# Patient Record
Sex: FEMALE | Race: WHITE | NOT HISPANIC OR LATINO | Employment: OTHER | URBAN - METROPOLITAN AREA
[De-identification: names, ages, dates, MRNs, and addresses within clinical notes are randomized per-mention and may not be internally consistent; named-entity substitution may affect disease eponyms.]

---

## 2023-07-12 ENCOUNTER — PATIENT OUTREACH (OUTPATIENT)
Dept: ONCOLOGY | Facility: CLINIC | Age: 63
End: 2023-07-12
Payer: COMMERCIAL

## 2023-07-12 ENCOUNTER — TRANSCRIBE ORDERS (OUTPATIENT)
Dept: OTHER | Age: 63
End: 2023-07-12

## 2023-07-12 DIAGNOSIS — D69.6 THROMBOCYTOPENIA (H): Primary | ICD-10-CM

## 2023-07-12 NOTE — PROGRESS NOTES
Referral received for benign heme services, see below.    Referral reason: Referred by [facility], per visit note:    Given you have a longstanding history of low platelet count plus family history of low platelet count and bleeding diathesis, you could benefit from platelet aggregation study and platelet electron microscopy to rule out inherited/congenital platelet disorders      Current abnormal labs: Available in CareEverywhere    Outreach: Call not placed to patient regarding referral.    Plan: Triage instructions updated and sent to NPS for completion.

## 2024-02-07 NOTE — PROGRESS NOTES
[facility]  [facility]  [facility] [address], [address], [place] [address]  Main: [phone], Fax: [phone]      Outpatient Visit Note:    Patient: Maxim Potter  MRN: [id number]  : 1960  SIRIA: 2024    History of Present Illness:  Maxim Potter is a 64 year old female with a history of CKD3, autoimmune hypothyroidism, alpha-1-antitrypsin carrier status, and arthritis that was referred to the [facility] for evaluation of thrombocytopenia and bleeding diatheses.    Pertinent Clinical History:  Maxim has a history of chronic thrombocytopenia, dating as far back as ~. She does not know if her platelets were low prior to that (was never told that they were).     There are documented platelets counts from 2017 in the 70-100K range.     For the past 2 years, platelets have been stable in the 50K range. The lowest platelet count she ever recalls seeing was 38K.     She has had otherwise normal CBCs. She has undergone extensive work-up for thrombocytopenia in the past. She has had negative hepatitis C and HIV screening, normal nutrient levels (B12, folate, Copper), normal hepatic function, normal SPEP, and unremarkable bone marrow biopsies  As such, she has historically been given the diagnosis of chronic ITP.  The aforementioned results are all available for review in Care Everywhere. She has never had ITP treatment before as her platelets have never dropped low enough to require this.    Interestingly, she reports a family history of thrombocytopenia plus bleeding diathesis (menorrhagia and epistaxis), particularly in her mother and maternal grandmother.    Maxim's bleeding history is significant for menorrhagia. She had partial hysterectomy in her 50s. When she was menstruating, she reports that though her periods were regular, she had quite heavy flow, sometimes saturating a pad/tampon within 20 minutes.  "She also recalls passing large clots. She was often told that she was anemic and would take PO iron intermittently. She would also try to incorporate iron rich foods, such as liver. She had epistaxis in childhood, but this did not persist into adulthood. She does endorse easy bruising. Her surgical history includes partial hysterectomy, cholecystectomy, bilateral TMJ surgeries, and caesarian section. None of these surgeries were c/b bleeding.    She has had a normal aPTT in past but, but I do not see a previous INR or previous von Willebrand studies on record. She has never had platelet aggregation studies or PEM.    Family History:   Maternal grandmother--History of menorrhagia and epistaxis. She was put on warfarin after a stroke in her later years and had bleeding issues/issues with erratic INRs.   Maternal grandfather--Alpha-1-antitrypsin.   Mother--History of menorrhagia, epistaxis, and post-operative bleeding.   Daughter (currently age 40)--History of menorrhagia.   Granddaughter (currently age 14)--History of menorrhagia    Objectives:  BP (!) 142/88   Pulse 69   Temp 97.3  F (36.3  C) (Oral)   Ht 1.6 m (5' 3\")   Wt 98.3 kg (216 lb 11.2 oz)   SpO2 97%   BMI 38.39 kg/m    Exam:   Constitutional: Appears well, no distress  Respiratory: Normal work of breathing.  Skin: no petechiae, no ecchymosis.  Neuro: AOx3    Labs:  Component  Ref Range & Units 12/29/2023   WHITE BLOOD COUNT          4.5 - 11.0 thou/cu mm 7.4   RED BLOOD COUNT            4.00 - 5.20 mil/cu mm 5.01   HEMOGLOBIN                12.0 - 16.0 g/dL 14.5   HEMATOCRIT                33.0 - 51.0 % 45.2   MCV                        80 - 100 fL 90   MCH                        26.0 - 34.0 pg 28.9   MCHC                      32.0 - 36.0 g/dL 32.1   RDW                        11.5 - 15.5 % 13.3   PLATELET COUNT            140 - 440 thou/cu mm 48 Low    MPV                          Comment: Unable to be determined   % NEUT  % 62.5   % LYMPH  % 25.3 "   % MONO  % 8.0   % EOS  % 3.8   % BASO  % 0.4   ABSOLUTE NEUTROPHILS      1.7 - 7.0 thou/cu mm 4.6   ABSOLUTE LYMPHOCYTES      0.9 - 2.9 thou/cu mm 1.9   ABSOLUTE MONOCYTES        <0.9 thou/cu mm 0.6   ABSOLUTE EOSINOPHILS      <0.5 thou/cu mm 0.3   ABSOLUTE BASOPHILS        <0.3 thou/cu mm 0.0     Component  Ref Range & Units 12/29/2023   SODIUM  136 - 145 mmol/L 144   POTASSIUM  3.5 - 5.1 mmol/L 4.3   CHLORIDE  98 - 107 mmol/L 106   CO2,TOTAL  22 - 29 mmol/L 29   ANION GAP  5 - 18 9   GLUCOSE  70 - 99 mg/dL 96   CALCIUM  8.8 - 10.2 mg/dL 9.4   BUN  8 - 23 mg/dL 23   CREATININE  0.50 - 0.90 mg/dL 1.06 High    BUN/CREAT RATIO  10 - 20 22 High    eGFR  >90 mL/min/1.73m2 60 Low    Comment: As of 03/15/2022, eGFR is calculated by the CKD-EPI creatinine equation without race adjustment.  eGFR can be influenced by muscle mass, exercise, and diet.  The reported eGFR is an estimation only and is only applicable if the renal function is stable.   ALBUMIN  4.0 - 4.9 g/dL 4.4   PROTEIN,TOTAL  6.0 - 8.0 g/dL 7.1   BILIRUBIN,TOTAL  0.0 - 1.2 mg/dL 0.4   ALK PHOSPHATASE  35 - 104 IU/L 96   ALT (SGPT)  10 - 35 IU/L 17   AST (SGOT)  10 - 35 IU/L 19   [facility]        Hepatitis C negative 03/10/2022.     HIV negative on 03/15/2022.     B12, folic acid, and copper levels normal on 03/15/2022.     SPEP normal on 04/05/2022.     Bone Marrow Study 04/08/2022:        BONE MARROW:  1.  Normocellular bone marrow for age (40-50% cellular) with trilineage hematopoiesis  2.  No morphologic features of myelodysplastic syndrome or other malignant myeloid neoplasm  3.  No morphologic or immunophenotypic features of involvement by lymphoma  4.  Adequate storage iron; adequate sideroblastic iron  5.  Ancillary studies:      a.  Cytogenetics:          - Chromosome analysis is pending; results will be appended to this report      b.  Molecular:          - Not performed  6.  See  comment    PERIPHERAL BLOOD:  1.  Moderate thrombocytopenia [facility]   Electronically signed by Willem Alcala MD on 5/2/2022 at 12:34 PM        Assessment:  In summary, Maxim Potter is a 64 year old female with a history of chronic thrombocytopenia. She has undergone extensive work-up for this in the past, and I agree that the findings have overall supported chronic ITP as the eitiology. However, she does have a history of bleeding diatheses out of proportion to her thrombocytopenia, suggestive of a possible qualitative platelet issue. Furthermore, she has a maternal family history of thrombocytopenia plus bleeding diatheses. This raises the question of a possible congenital platelet disorder. Also on the differential is ITP + other congenital bleeding disorder (ie von Willebrand disease, platelet dysfunction, clotting factor deficiency). With previously normal bone marrow biopsies (most recently in April 2022), MDS is lower on the differential.      Plan:  1) Repeat CBC and CMP.  2) Von Willebrand panel, coag times.  3) PEM + platelet aggregation studies with PRP.     I will reach out regarding results and associated recommendations when these become available to me.      60 minutes spent on the date of the encounter doing chart review, history and exam, documentation and further activities per the note.           Jenelle Johansen PA-C, [facility]  [facility]otting [facility]  [facility]2 [address], [address], [place] [address]  Main: [phone], Fax: [phone]    - - - - - - - - - -     Addendum   February 28, 2024    Regarding below results:  Electron microscopy, in combination with the patient's personal/family history is concerning for a congenital platelet disorder vs. myelodysplasia. In discussion with Dr. Chito MD and WERO Guadarrama, we are recommending the following genetic testing  profile:    ABCG5, ABCG8, ACTB, ACTN1, ANKRD26, CYCS, DIAPH1, ETV6, FLI1, FLNA, GATA1, GFI1B, GP1BA, GP1BB, GP6, GP9, HOXA11, MECOM, MPL, MYH9, P2RY12, PLAU, PRKACG, RASGRP2, RBM8A, RUNX1, SLFN14, SRC, TBXA2R, TPM4, TUBB1, WAS       Jenelle Johansen PA-C, [facility]  [facility]  [address] [place] [address], [address], [place] [id number]  Main: [phone], Fax: [phone]              Comment:    Test                             Result           Flag  Unit  RefValue  ----------------------------------------------------------------------  Platelet TEM, B    Platelet TEM                   Performed                                Interpretation                 SEE NOTE                                  IMPRESSION:      Platelet transmission electron microscopy (PTEM) studies      demonstrate large and round platelets with decreased alpha      granules and increased canalicular networks. These      ultrastructural abnormalities are suggestive for a      congenital or acquired platelet disorder. See comment.             COMMENT:  Large platelets are evident by PTEM.  The      decreased alpha granules and increased canalicular system      are consistent with an ultra-structural abnormality,      suggesting an underlying platelet disorder. The      differential diagnosis includes Tom Soulier syndrome,      MYH9 mutation-associated diseases and other possible      mutations of various genes including FLI1, ABCG5, GATA1,      GFI1B, GPIBA, GPIX, tubulin beta 1 ACTN1, DIAPH1 or PRKACG,      TUBB1, etc.  Acquired macrothrombocytopenia can be seen in      myeloid neoplasms such as a myelodysplastic syndrome, etc.      Recommend clinical correlation and consider repeat PTEM or      molecular testing to identify the possible causal mutation      if clinically indicated.             ELECTRON MICROSCOPIC DESCRIPTION:      PTEM studies are performed. The  quality of the EM studies      is adequate.             Whole mount PTEM study demonstrates essentially normal mean      dense body count, 5.1 dense granules per platelet (100      platelets are counted; normal adult range is greater than      or equal to 1.2 dense granules per platelet).  There are no      abnormal dense granules identified. Note: Dense body count      could be falsely low due to inadequate sample      transportation or storage.             Thin section PTEM study demonstrates increased large and      round platelets with decreased alpha granules and increased      vacuoles and canalicular networks. There are no abnormal      membranous inclusions in platelets.       Component      Latest Ref Rng 2/19/2024  8:22 AM   ATP release with Thrombin      >=0.50 nm 0.27 (L)    ATP release with Thrombin repeat      >=0.50 nm 0.27 (L)    ATP release with 5 Micromolar ADP      >=0.40 nm <0.10 (L)    ATP release with 5 Micromolar ADP repeat      >=0.40 nm <0.10 (L)    ATP release with 10 Micromolar ADP      >=0.40 nm 0.14 (L)    ATP release with 10 Micromolar ADP repeat      >=0.40 nm 0.20 (L)    ADP      >=64 % 54 (L)    ADP 5 micromolar repeat      >=64 % 61 (L)    ADP 10 micromolar      >=64 % 73    ADP 10 micromolar repeat      >=64 % 71    Epinephrine      >=63 % 41 (L)    Epinephrine 10 micromolar repeat      >=63 % 38 (L)    Collagen      >=66 % 82    Arachidonic Acid      >=63 % 79    Ristocetin 0.50 mg/mL      <=6 % 5    Ristocetin 1.00 mg/mL      >=11 % 93    Ristocetin 1.25 mg/mL      >=76 % 99    Interpretation The patient is noted to have thrombocytopenia and the platelet count of the platelet rich plasma (PRP) used for testing is decreased. Platelet aggregation reference ranges in this laboratory have been established for a platelet count of 250x10^9/L in the PRP. Due to thrombocytopenia in the PRP, aggregation tracings are interpreted based on ranges reported in the literature (Shiva et al.  Thromb Haemost 2008;100:134-145). Aggregation is present with all agonists with the exception of low concentration (0.5 mg/mL) Ristocetin and maximal platelet aggregation is within normal expected limits for the degree of thrombocytopenia in the PRP with 5 micromolar and 10 micromolar ADP, Epinephrine, Collagen, Arachidonic Acid, and multiple concentrations of Ristocetin. Deaggregation is present with 5 micromolar and 10 micromolar ADP. Reference ranges for ATP release have not been established in this laboratory at this level of thrombocytopenia in the PRP. ATP release is detected with Thrombin and 10 micromolar ADP and is not detected with 5 micromolar ADP. Overall, these findings are non-diagnostic for a platelet function disorder given the limitations of testing on a thrombocytopenic sample. Clinical correlation with personal and family bleeding history and medication use (prescribed, over the counter, and herbal) is recommended. Consider obtaining platelet electron microscopy and genetic testing testing for platelet disorders. If an acquired immune cause is suspected, consider obtaining a platelet antibody screen and platelet associated antibody.        Component      Latest Ref [facility] 2/19/2024  8:22 AM   Factor 8 Assay      55 - 200 % 129    von Willebrand Factor Antigen      50 - 200 % 151    von Willebrand Factor Multimers von Willebrand factor multimeric analysis shows a normal   multimeric distribution.    von Willebrand Factor Activity      50 - 180 % 149        Component      Latest Ref [facility] 2/19/2024  8:22 AM   INR      0.85 - 1.15  1.04    PTT      22 - 38 Seconds 27        Component      Latest Ref [facility] 2/19/2024  8:22 AM   Iron      37 - 145 ug/dL 76    Iron Binding Capacity      240 - 430 ug/dL 230 (L)    Iron Sat Index      15 - 46 % 33    Ferritin      11 - 328 ng/mL 118       ----  Addendum   March 29, 2024  No pathogenic mutations were identified on the below panel.     The eitiology of [facility]  thrombocytopenia and concurrent platelet abnormalities, including structural abnormalities seen on electron microscopy and functional abnormalities seen on aggregation studies, remains somewhat unclear.     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 The declining platelet count could be a feature of said disorder, or perhaps, there could be an overlying immune component.      Her unremarkable bone marrow biopsy back in April 2022, as well as her otherwise completely normal CBC and peripheral smear, are reassuring against myelodysplastic syndrome, though we should continue to monitor this.    Our recommendations at this time include:  1) CBC with platelets and differential every 3-4 months.  2) Should platelets fall below 20-30K, we can trial steroids or IVIG. If responsive, this would be supportive of any overlying immune component (ie ITP).  3) Should she develop any significant bleeding, or should she require surgery, again we could try steroids or IVIG to increase her platelet count. If unresponsive, we would proceed with platelet transfusion as the intervention of choice.  4) Follow-up with Dr. Dale and myself at the [facility] in 3-6 months.                             TEST REQUESTED:  Custom Bleeding Panel   Next generation sequencing and copy number variation analysis of genes listed in 'Background' section below.     RESULTS: NEGATIVE  Pathogenic/Likely Pathogenic Variant(s): None Detected  Variant(s) of Uncertain Significance: None Detected    Interpretation    No pathogenic or likely pathogenic variants were detected in the genes analyzed. Therefore, a genetic cause for this patient's symptoms was not identified. Genetic counseling regarding these results is recommended.             BACKGROUND:  Custom Bleeding Panel: ABCG5, ABCG8, ACTB, ACTN1, ANKRD26, CYCS, DIAPH1, ETV6, FLI1, FLNA, GATA1, GFI1B, GP1BA, GP1BB, GP6, GP9, HOXA11, MECOM,  MPL, MYH9, P2RY12, PLAU, PRKACG, RASGRP2, RBM8A, RUNX1, SLFN14, SRC, TBXA2R, TPM4, TUBB1, WAS.

## 2024-02-08 ENCOUNTER — OFFICE VISIT (OUTPATIENT)
Dept: HEMATOLOGY | Facility: CLINIC | Age: 64
End: 2024-02-08
Attending: PHYSICIAN ASSISTANT
Payer: COMMERCIAL

## 2024-02-08 VITALS
SYSTOLIC BLOOD PRESSURE: 142 MMHG | DIASTOLIC BLOOD PRESSURE: 88 MMHG | TEMPERATURE: 97.3 F | WEIGHT: 216.7 LBS | OXYGEN SATURATION: 97 % | HEIGHT: 63 IN | BODY MASS INDEX: 38.39 KG/M2 | HEART RATE: 69 BPM

## 2024-02-08 DIAGNOSIS — D69.6 THROMBOCYTOPENIA (H): ICD-10-CM

## 2024-02-08 PROCEDURE — 99205 OFFICE O/P NEW HI 60 MIN: CPT | Performed by: PHYSICIAN ASSISTANT

## 2024-02-08 PROCEDURE — 99213 OFFICE O/P EST LOW 20 MIN: CPT | Performed by: PHYSICIAN ASSISTANT

## 2024-02-08 RX ORDER — TRIAMCINOLONE ACETONIDE 1 MG/G
OINTMENT TOPICAL
COMMUNITY
Start: 2023-08-31

## 2024-02-08 RX ORDER — LEVOTHYROXINE SODIUM 50 UG/1
50 TABLET ORAL
COMMUNITY
Start: 2024-01-15

## 2024-02-08 RX ORDER — ALBUTEROL SULFATE 90 UG/1
1-2 AEROSOL, METERED RESPIRATORY (INHALATION)
COMMUNITY
Start: 2023-01-27

## 2024-02-08 NOTE — PATIENT INSTRUCTIONS
[facility]  Center for Bleeding and Clotting Disorders  [facility], [address], [place] [address]  Main: [phone], Fax: [phone]      PLAN:    We will call you to arrange a blood draw to obtain the below listed tests.    I will reach out to you when I see the results. The results can sometimes take a couple weeks to come back.

## 2024-02-19 ENCOUNTER — ALLIED HEALTH/NURSE VISIT (OUTPATIENT)
Dept: HEMATOLOGY | Facility: CLINIC | Age: 64
End: 2024-02-19
Payer: COMMERCIAL

## 2024-02-19 ENCOUNTER — TELEPHONE (OUTPATIENT)
Dept: HEMATOLOGY | Facility: CLINIC | Age: 64
End: 2024-02-19

## 2024-02-19 DIAGNOSIS — D69.6 THROMBOCYTOPENIA (H): ICD-10-CM

## 2024-02-19 LAB
ALBUMIN SERPL BCG-MCNC: 4.5 G/DL (ref 3.5–5.2)
ALP SERPL-CCNC: 97 U/L (ref 40–150)
ALT SERPL W P-5'-P-CCNC: 17 U/L (ref 0–50)
ANION GAP SERPL CALCULATED.3IONS-SCNC: 10 MMOL/L (ref 7–15)
APTT PPP: 27 SECONDS (ref 22–38)
AST SERPL W P-5'-P-CCNC: 19 U/L (ref 0–45)
BILIRUB SERPL-MCNC: 0.4 MG/DL
BUN SERPL-MCNC: 13.2 MG/DL (ref 8–23)
CALCIUM SERPL-MCNC: 9.6 MG/DL (ref 8.8–10.2)
CHLORIDE SERPL-SCNC: 105 MMOL/L (ref 98–107)
CREAT SERPL-MCNC: 0.97 MG/DL (ref 0.51–0.95)
DEPRECATED HCO3 PLAS-SCNC: 27 MMOL/L (ref 22–29)
EGFRCR SERPLBLD CKD-EPI 2021: 66 ML/MIN/1.73M2
EPI 10 MICROMOLAR REPEAT: 38 %
ERYTHROCYTE [DISTWIDTH] IN BLOOD BY AUTOMATED COUNT: 12.5 % (ref 10–15)
FERRITIN SERPL-MCNC: 118 NG/ML (ref 11–328)
GLUCOSE SERPL-MCNC: 98 MG/DL (ref 70–99)
HCT VFR BLD AUTO: 45.2 % (ref 35–47)
HGB BLD-MCNC: 14.7 G/DL (ref 11.7–15.7)
INR PPP: 1.04 (ref 0.85–1.15)
IRON BINDING CAPACITY (ROCHE): 230 UG/DL (ref 240–430)
IRON SATN MFR SERPL: 33 % (ref 15–46)
IRON SERPL-MCNC: 76 UG/DL (ref 37–145)
Lab: NORMAL
MCH RBC QN AUTO: 29.3 PG (ref 26.5–33)
MCHC RBC AUTO-ENTMCNC: 32.5 G/DL (ref 31.5–36.5)
MCV RBC AUTO: 90 FL (ref 78–100)
PA AA BLD-ACNC: 79 %
PA ADP BLD-ACNC: 0.14 NM
PA ADP BLD-ACNC: 0.2 NM
PA ADP BLD-ACNC: 54 %
PA ADP BLD-ACNC: 61 %
PA ADP BLD-ACNC: 71 %
PA ADP BLD-ACNC: 73 %
PA ADP BLD-ACNC: <0.1 NM
PA ADP BLD-ACNC: <0.1 NM
PA COLL PRP QL: 82 %
PA EPINEPH PRP QL: 41 %
PA RIST PRP QL: 5 %
PA RIST PRP QL: 93 %
PA RIST PRP QL: 99 %
PA RIST PRP QL: ABNORMAL
PA THROMBIN PRP: 0.27 NM
PA THROMBIN PRP: 0.27 NM
PERFORMING LABORATORY: NORMAL
PLATELET # BLD AUTO: 37 10E3/UL (ref 150–450)
POTASSIUM SERPL-SCNC: 3.9 MMOL/L (ref 3.4–5.3)
PROT SERPL-MCNC: 6.9 G/DL (ref 6.4–8.3)
RBC # BLD AUTO: 5.01 10E6/UL (ref 3.8–5.2)
SODIUM SERPL-SCNC: 142 MMOL/L (ref 135–145)
SPECIMEN STATUS: NORMAL
TEST NAME: NORMAL
WBC # BLD AUTO: 6.5 10E3/UL (ref 4–11)

## 2024-02-19 PROCEDURE — 85027 COMPLETE CBC AUTOMATED: CPT

## 2024-02-19 PROCEDURE — 82728 ASSAY OF FERRITIN: CPT

## 2024-02-19 PROCEDURE — 85730 THROMBOPLASTIN TIME PARTIAL: CPT

## 2024-02-19 PROCEDURE — 85247 CLOT FACTOR VIII MULTIMETRIC: CPT

## 2024-02-19 PROCEDURE — 84999 UNLISTED CHEMISTRY PROCEDURE: CPT

## 2024-02-19 PROCEDURE — 85576 BLOOD PLATELET AGGREGATION: CPT | Mod: 26 | Performed by: PATHOLOGY

## 2024-02-19 PROCEDURE — 36415 COLL VENOUS BLD VENIPUNCTURE: CPT

## 2024-02-19 PROCEDURE — 85390 FIBRINOLYSINS SCREEN I&R: CPT

## 2024-02-19 PROCEDURE — 85245 CLOT FACTOR VIII VW RISTOCTN: CPT

## 2024-02-19 PROCEDURE — 999N000103 HC STATISTIC NO CHARGE FACILITY FEE

## 2024-02-19 PROCEDURE — 85576 BLOOD PLATELET AGGREGATION: CPT | Performed by: PATHOLOGY

## 2024-02-19 PROCEDURE — 85246 CLOT FACTOR VIII VW ANTIGEN: CPT

## 2024-02-19 PROCEDURE — 80053 COMPREHEN METABOLIC PANEL: CPT

## 2024-02-19 PROCEDURE — 88348 ELECTRON MICROSCOPY DX: CPT

## 2024-02-19 PROCEDURE — 85610 PROTHROMBIN TIME: CPT

## 2024-02-19 PROCEDURE — 85240 CLOT FACTOR VIII AHG 1 STAGE: CPT

## 2024-02-19 PROCEDURE — 83550 IRON BINDING TEST: CPT

## 2024-02-19 NOTE — TELEPHONE ENCOUNTER
[id number]  Maxim ANGELIC Tariq  64 year old female  [facility] Diagnosis: thrombocytopenia  [facility] Provider: Jenelle Johansen PA-C    DATE/TIME OF CALL RECEIVED FROM LAB:  02/19/24 at 10:33 AM   LAB TEST:  platelet count  LAB VALUE:  37,000  PROVIDER NOTIFIED?: Yes  PROVIDER NAME: Jenelle Johansen PA-C  DATE/TIME LAB VALUE REPORTED TO PROVIDER: 1033  MECHANISM OF PROVIDER NOTIFICATION:  inbasket  PROVIDER RESPONSE: ROBERTO CAICEDON, RN, PHN   [facility] for Bleeding and Clotting Disorders   Office: [phone]  Fax: [phone]

## 2024-02-21 LAB
FACT VIII ACT/NOR PPP: 129 % (ref 55–200)
VWF AG ACT/NOR PPP IA: 151 % (ref 50–200)
VWF:AC ACT/NOR PPP IA: 149 % (ref 50–180)

## 2024-02-22 LAB — VWF MULTIMERS PPP IB: NORMAL

## 2024-02-23 LAB — MAYO MISC RESULT: NORMAL

## 2024-02-27 LAB — VWF MULTIMERS PPP QL: NORMAL

## 2024-03-01 ENCOUNTER — ALLIED HEALTH/NURSE VISIT (OUTPATIENT)
Dept: HEMATOLOGY | Facility: CLINIC | Age: 64
End: 2024-03-01
Attending: PHYSICIAN ASSISTANT
Payer: COMMERCIAL

## 2024-03-01 DIAGNOSIS — D69.6 THROMBOCYTOPENIA (H): ICD-10-CM

## 2024-03-01 LAB
INTERPRETATION: NORMAL
INTERPRETATION: NORMAL
LAB PDF RESULT: NORMAL
LOCATION OF TASK: NORMAL
SIGNIFICANT RESULTS: NORMAL
SPECIMEN DESCRIPTION: NORMAL
TEST DETAILS, MDL: NORMAL

## 2024-03-01 PROCEDURE — 999N000103 HC STATISTIC NO CHARGE FACILITY FEE

## 2024-03-05 ENCOUNTER — TELEPHONE (OUTPATIENT)
Dept: LAB | Facility: CLINIC | Age: 64
End: 2024-03-05
Payer: COMMERCIAL

## 2024-03-05 NOTE — PROGRESS NOTES
I called Maxim to update her on insurance coverage for her genetic testing. [place] approved and valid through 4/29/24. However, I was unable to reach Maxim. I left a voicemail with my name, phone number, and a brief reason for calling. I also sent a Elyss[employer]ori message with this information to Maxim.      Avelino Tran  Genomics Billing    [facility]  Molecular Diagnostics Laboratory  [address] [address]  [email or web address]  Office: [phone]  Fax:   Employed by [facility]

## 2024-03-06 ENCOUNTER — TELEPHONE (OUTPATIENT)
Dept: LAB | Facility: CLINIC | Age: 64
End: 2024-03-06
Payer: COMMERCIAL

## 2024-03-06 ENCOUNTER — LAB (OUTPATIENT)
Dept: LAB | Facility: CLINIC | Age: 64
End: 2024-03-06
Payer: COMMERCIAL

## 2024-03-06 DIAGNOSIS — D69.6 THROMBOCYTOPENIA (H): Primary | ICD-10-CM

## 2024-03-06 PROCEDURE — 81479 UNLISTED MOLECULAR PATHOLOGY: CPT | Performed by: PHYSICIAN ASSISTANT

## 2024-03-06 PROCEDURE — 36415 COLL VENOUS BLD VENIPUNCTURE: CPT

## 2024-03-06 PROCEDURE — G0452 MOLECULAR PATHOLOGY INTERPR: HCPCS | Mod: 26 | Performed by: STUDENT IN AN ORGANIZED HEALTH CARE EDUCATION/TRAINING PROGRAM

## 2024-03-06 NOTE — PROGRESS NOTES
[facility] staff had left a voicemail and [employer] message to the patient, Maxim on 3/6/24 regarding the insurance update for her genetic testing. [facility] staff received reply message and voicemail from patient and returned the patient's call.    Notified Maxim, patient, that we received prior authorization approval for her genetic testing. Valid through 4/29/24.    Explained that insurance benefits may still apply, therefore, there could be an out of pocket cost. Provided Maxim with estimated out of pocket cost for testing.    Maxim expressed understanding and stated that she wants to proceed with her testing. We will call when results are available. Maxim had no further questions. [facility] Hold For Preauthorization: [[id number]] order was placed by a genetics provider.        Avelino Tran  Genomics Billing    [facility]  Molecular Diagnostics Laboratory  [address] [address]  [email or web address]  [email or web address]  Office: [phone]  Fax:   Employed by [employer]

## 2024-03-29 ENCOUNTER — TELEPHONE (OUTPATIENT)
Dept: HEMATOLOGY | Facility: CLINIC | Age: 64
End: 2024-03-29
Payer: COMMERCIAL

## 2024-03-29 NOTE — TELEPHONE ENCOUNTER
[facility] for Bleeding and Clotting [facility]  [address] [place] [address], [address], [place] [address]  Main: [phone], Fax: [phone]      Called patient to review the results of her genetic testing. The following was discussed:    No pathogenic mutations were identified on the below panel.      The eitiology of Maxim's thrombocytopenia and concurrent platelet abnormalities, including structural abnormalities seen on electron microscopy and functional abnormalities seen on aggregation studies, remains somewhat unclear.      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 The declining platelet count could be a feature of said disorder, or perhaps, there could be an overlying immune component.       Her unremarkable bone marrow biopsy back in April 2022, as well as her otherwise completely normal CBC and peripheral smear, are reassuring against myelodysplastic syndrome, though we should continue to monitor this.     Our recommendations at this time include:  1) CBC with platelets and differential every 3 months (this is being done in the [employer] system with results available in care everywhere).  2) Should platelets fall below 20-30K, we can trial steroids or IVIG. If responsive, this would be supportive of any overlying immune component (ie ITP).  3) Should she develop any significant bleeding, or should she require surgery, again we could try steroids or IVIG to increase her platelet count. If unresponsive, we would proceed with platelet transfusion as the intervention of choice.  4) Follow-up with Dr. Dale and myself at the [facility] in 4-6 months.        Jenelle Johansen PA-C, [facility]  Center for Bleeding and Clotting Disorders  [address] [place] [address], [address] [place] [id number]  Main: [phone], Fax: [phone]

## 2024-07-19 ENCOUNTER — TELEPHONE (OUTPATIENT)
Dept: PULMONOLOGY | Facility: CLINIC | Age: 64
End: 2024-07-19
Payer: COMMERCIAL

## 2024-07-19 NOTE — TELEPHONE ENCOUNTER
Patient confirmed scheduled appointment:  Date: 10/21/24  Time: 2:30PM  Visit type: CXR  Provider: SALVADOR  Location: [facility]  Testing/imaging: N/A  Additional notes: N/A

## 2024-07-30 ENCOUNTER — OFFICE VISIT (OUTPATIENT)
Dept: HEMATOLOGY | Facility: CLINIC | Age: 64
End: 2024-07-30
Attending: INTERNAL MEDICINE
Payer: COMMERCIAL

## 2024-07-30 VITALS
BODY MASS INDEX: 36.94 KG/M2 | SYSTOLIC BLOOD PRESSURE: 137 MMHG | HEART RATE: 64 BPM | OXYGEN SATURATION: 98 % | WEIGHT: 208.5 LBS | TEMPERATURE: 98 F | DIASTOLIC BLOOD PRESSURE: 79 MMHG | HEIGHT: 63 IN

## 2024-07-30 DIAGNOSIS — D69.6 THROMBOCYTOPENIA (H): Primary | ICD-10-CM

## 2024-07-30 PROCEDURE — 99215 OFFICE O/P EST HI 40 MIN: CPT | Performed by: INTERNAL MEDICINE

## 2024-07-30 PROCEDURE — 99417 PROLNG OP E/M EACH 15 MIN: CPT | Performed by: INTERNAL MEDICINE

## 2024-07-30 PROCEDURE — 99213 OFFICE O/P EST LOW 20 MIN: CPT | Performed by: INTERNAL MEDICINE

## 2024-07-30 NOTE — PROGRESS NOTES
[facility]  [facility]  [address], [address], [place] [address]  Main: [phone], Fax: [phone]        Outpatient Clinic Visit  Date:  07/30/2024      Maxim Potter is a 64-year-old woman here today for follow-up and further discussion regarding the etiology of her chronic thrombocytopenia.  Details of her history can be found in our previous note from 2/8/2024, and the subsequent addenda.    In summary, she has chronic thrombocytopenia which was first noted in approximately 2015 and seems to have worsened over time.  However, over the last 2 years, her platelet count has been fairly stable in the 50,000 range.  She is a personal and family history of bleeding issues including easy bruising, epistaxis, and heavy menstrual bleeding.  It was initially felt that her thrombocytopenia was due to chronic ITP although the amount of bleeding she has experienced is out of proportion to what would be expected for her platelet counts, and the apparent familial history would also be inconsistent with that diagnosis.  A primary bone marrow disorder such as MDS has also been considered, although again, the family history would suggest otherwise.  In addition, bone marrow biopsy performed in 2022 showed no morphologic evidence of dysplasia and cytogenetic studies were also normal.    Thus, we have suspected the presence of of a congenital platelet disorder, and pursued additional testing to try to clarify that.  Platelet aggregation testing was performed and was not diagnostic of a platelet function disorder given the limitations of the testing based on the fact that her platelet count was not normal when the testing was performed.  However, platelet electron microscopy showed clear evidence of ultrastructural abnormalities including decreased alpha granules and increased canalicular networks which are suggestive of an underlying platelet disorder.  We  subsequently performed genetic testing but failed to identify any pathogenic or likely pathogenic variants in the gene panel tested.    She presents today with some additional family information.  She has 2 children; her daughter has borderline low platelets (specific details unclear), but she reports that her son has a platelet count in the 100,000-115,000 range.  He is currently 38 years old, and this reported platelet count is similar to Maxim's platelet count several years ago.  In addition, she has 4 grandchildren (her daughter has 2 teenage girls, and her son has an 11-year-old daughter and a 9-year-old son).  It is unclear whether any of these children have had their platelet counts checked, although she reports that some of them seem to have a bleeding tendency similar to her own, and what has been seen in other members of her extended family (easy bruising, epistaxis, menorrhagia).      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  Maxim has a personal and family history of bleeding tendency consistent with a defect in primary hemostasis.  Von Willebrand disease and other coagulation factor deficiencies have been ruled out.  She has chronic thrombocytopenia which was first noted a number of years ago and seems to have worsened over time.  As outlined above, our diagnostic evaluation has identified ultrastructural abnormalities in her platelets which would support a diagnosis of a congenital platelet disorder.  Although we did not find any pathogenic or likely pathogenic mutations in the gene panel we tested, there are additional genes which have not yet been assessed.    She now reports that her son has thrombocytopenia with a platelet count in the 100,000-115,000 range which is similar to where her platelet count was several years ago.  This suggests that perhaps there is a familial platelet disorder here and that one feature of it is progressive thrombocytopenia over time.  Thus, we  discussed further evaluation of her son, including platelet electron microscopy.  If he has the same ultrastructural abnormalities, this would provide convincing evidence of a familial platelet disorder.  This would also provide justification for more extended genetic testing.    She will ask her son to contact us so that we can coordinate further testing.  In the meantime, she will continue to have her platelet count monitored through her primary clinic every 3 to 6 months.  She will forward those results to us, and we will plan to see her back in about 12 months, sooner if needed.    Total time on date of encounter 90 minutes, including review of medical records and labs, clinic visit, and documentation.      Shiva Dale MD  Professor of Medicine  Division of Hematology, Oncology, and Transplantation  Director, [facility]

## 2024-10-10 NOTE — CONFIDENTIAL NOTE
RECORDS RECEIVED FROM: internal    DATE RECEIVED: 10.21.24    NOTES STATUS DETAILS   OFFICE NOTE from referring provider internal  Self referred    MEDICATION LIST internal     IMAGING  (NEED IMAGES AND REPORTS)     CHEST XRAY (CXR) internal  Scheduled 10.21.24    TESTS     PULMONARY FUNCTION TESTING (PFT) internal  Scheduled 10.21.24

## 2024-10-17 ASSESSMENT — ASTHMA QUESTIONNAIRES
QUESTION_2 LAST FOUR WEEKS HOW OFTEN HAVE YOU HAD SHORTNESS OF BREATH: ONCE OR TWICE A WEEK
QUESTION_3 LAST FOUR WEEKS HOW OFTEN DID YOUR ASTHMA SYMPTOMS (WHEEZING, COUGHING, SHORTNESS OF BREATH, CHEST TIGHTNESS OR PAIN) WAKE YOU UP AT NIGHT OR EARLIER THAN USUAL IN THE MORNING: ONCE OR TWICE
ACT_TOTALSCORE: 22
QUESTION_1 LAST FOUR WEEKS HOW MUCH OF THE TIME DID YOUR ASTHMA KEEP YOU FROM GETTING AS MUCH DONE AT WORK, SCHOOL OR AT HOME: A LITTLE OF THE TIME
QUESTION_4 LAST FOUR WEEKS HOW OFTEN HAVE YOU USED YOUR RESCUE INHALER OR NEBULIZER MEDICATION (SUCH AS ALBUTEROL): NOT AT ALL
ACT_TOTALSCORE: 22
QUESTION_5 LAST FOUR WEEKS HOW WOULD YOU RATE YOUR ASTHMA CONTROL: COMPLETELY CONTROLLED

## 2024-10-21 ENCOUNTER — PRE VISIT (OUTPATIENT)
Dept: PULMONOLOGY | Facility: CLINIC | Age: 64
End: 2024-10-21

## 2024-10-21 ENCOUNTER — OFFICE VISIT (OUTPATIENT)
Dept: PULMONOLOGY | Facility: CLINIC | Age: 64
End: 2024-10-21
Payer: COMMERCIAL

## 2024-10-21 ENCOUNTER — ANCILLARY PROCEDURE (OUTPATIENT)
Dept: GENERAL RADIOLOGY | Facility: CLINIC | Age: 64
End: 2024-10-21
Payer: COMMERCIAL

## 2024-10-21 ENCOUNTER — TRANSFERRED RECORDS (OUTPATIENT)
Dept: HEALTH INFORMATION MANAGEMENT | Facility: CLINIC | Age: 64
End: 2024-10-21

## 2024-10-21 VITALS
SYSTOLIC BLOOD PRESSURE: 116 MMHG | HEIGHT: 63 IN | WEIGHT: 210 LBS | HEART RATE: 65 BPM | OXYGEN SATURATION: 98 % | BODY MASS INDEX: 37.21 KG/M2 | DIASTOLIC BLOOD PRESSURE: 70 MMHG

## 2024-10-21 DIAGNOSIS — R05.3 CHRONIC COUGH: Primary | ICD-10-CM

## 2024-10-21 DIAGNOSIS — J45.909 ASTHMA: ICD-10-CM

## 2024-10-21 PROCEDURE — 94729 DIFFUSING CAPACITY: CPT | Performed by: INTERNAL MEDICINE

## 2024-10-21 PROCEDURE — 71046 X-RAY EXAM CHEST 2 VIEWS: CPT | Mod: GC | Performed by: RADIOLOGY

## 2024-10-21 PROCEDURE — 94726 PLETHYSMOGRAPHY LUNG VOLUMES: CPT | Performed by: INTERNAL MEDICINE

## 2024-10-21 PROCEDURE — 99205 OFFICE O/P NEW HI 60 MIN: CPT | Mod: 25

## 2024-10-21 PROCEDURE — 99213 OFFICE O/P EST LOW 20 MIN: CPT

## 2024-10-21 PROCEDURE — 94375 RESPIRATORY FLOW VOLUME LOOP: CPT | Performed by: INTERNAL MEDICINE

## 2024-10-21 PROCEDURE — 94150 VITAL CAPACITY TEST: CPT | Performed by: INTERNAL MEDICINE

## 2024-10-21 ASSESSMENT — PAIN SCALES - GENERAL: PAINLEVEL: NO PAIN (0)

## 2024-10-21 NOTE — NURSING NOTE
Chief Complaint   Patient presents with    Consult     New Asthma     Medications reviewed and vital signs taken.   Edmundo Little CMA

## 2024-10-21 NOTE — LETTER
10/21/2024      Maxim Potter  [address] [place] [id number]      Dear Colleague,    Thank you for referring your patient, Maxim Potter, to the [facility]. Please see a copy of my visit note below.      [facility] [address] [address]  Phone: [phone]  Fax: [phone]    Patient:  Maxim Potter, Date of birth 1960  Date of Visit:  10/21/2024  Referring Provider Referred Self      Assessment & Plan     Reported heterozygote for alpha-1 antitrypsin mutation  History of exercise-induced asthma  Chronic cough    Regarding the alpha-1 antitrypsin diagnosis, I cannot find any blood work in our system that confirms this diagnosis.  We will perform an alpha ID fingerstick test today in order to look for both a protein level and any mutations she may have.  We spent a long time today in the clinic discussing what alpha-1 antitrypsin is, how it can affect the liver and the lung.  She does have a very remote smoking history, but recent testing shows statistically normal lung function and a chest CT scan does not mention any significant emphysema.  This would fit with an alpha-1 antitrypsin heterozygote phenotype.  I tried to provide her reassurance that as long as she does not start smoking, I do not suspect she will ever have any clinically significant lung disease related to alpha-1 antitrypsin.  I also let her know that it be very unlikely for her to develop any significant liver disease.  Recent liver enzymes are normal, and a relatively recent liver ultrasound also appears normal.  Pending the results of her alpha-1 fingerstick test, I advised that she return to the clinic in 3 years or so with repeat pulmonary function testing.    We did talk about her chronic cough today.  Her chest x-ray does show some peribronchial cuffing in my opinion.  She does  not have any wheezing or crackling on exam, but given her cough symptoms history of possible asthma, and peribronchial cuffing it would be reasonable to try a low-dose inhaled corticosteroid to see if her nocturnal cough goes away.  She is not interested in doing this right now.  The cough is not that bothersome to her, and she would prefer to monitor her symptoms.    Medical Decision Making     I spent a total of 70 minutes on the day of the visit.   Time spent by me doing chart review, history and exam, documentation and further activities per the note       Av Bauman MD                Today's visit note:     Chief Complaint: Consult (New Asthma )      HISTORY OF PRESENT ILLNESS:    Maxim Potter is a 64 year old year old female who is being seen for cough and history of an alpha 1 mutation.     She has a long history of exercise-induced asthma.  She was first prescribed an inhaler about 30 years ago, but her symptoms preceded that.  She started using the inhaler because she was having even more bronchitis issues at that time.  She was given some oral steroids at that time as well, but had a bad reaction and is never taken oral steroids again.  She used to do triathlons and a lot of ballroom dancing.  Albuterol significantly helped her respiratory status when doing those activities.    Over the last 1-1/2 years, she has been coughing a lot at bedtime.  It is not really a problem during the day at all.  The coughing at night is something that she mentions, but is not very concerned about.  It is nonproductive.  It is not disrupting her sleep all that well.    She does not have any postnasal drip symptoms, but she does have some gastroesophageal reflux symptoms after eating.  She does have a history of colitis as well.    She mentions that she has an alpha-1 antitrypsin mutation, and that she is a heterozygote.  She actually travel to [facility] back in 2015 to be evaluated.  The  details of that evaluation are not clear to me at this time.  She mentions a few people in her family tree that also have a mutation, but she is aware of only 2 that have a homozygous ZZ mutation.    She also has some disorder with her platelets, but is currently being worked up by Dr. Dale in the bleeding and clotting clinic.    She is concerned that her alpha 1 mutation and her platelet disorder could somehow be intertwined and indicative of significant health consequences in the future for her.           Past Medical and Surgical History:     Past Medical History:   Diagnosis Date     Thyroid disease 8/2012    hashimoto biopsy 2012, started thyroid med sring 2024     Past Surgical History:   Procedure Laterality Date     ABDOMEN SURGERY  2/1986    C section     BIOPSY  2022, 2023    bone marrow biopsy, breast biopsy     CHOLECYSTECTOMY  2004     COLONOSCOPY  [id number]    yearly, else routine     HEAD & NECK SURGERY  1985    TMJ           Family History:     Family History   Problem Relation Age of Onset     Diabetes Mother         alpha one     Coronary Artery Disease Mother      Hypertension Mother      Genetic Disorder Mother         alpha one     Thyroid Disease Mother         hypothyroid     Obesity Mother      LUNG DISEASE Mother         scerosis of the liver     Asthma Maternal Grandfather         alpha one     Cerebrovascular Disease Maternal Grandmother      Breast Cancer Sister         liver, lung, breast cancer; AAD 52              Social History:     Social History     Socioeconomic History     Marital status:      Spouse name: Not on file     Number of children: Not on file     Years of education: Not on file     Highest education level: Not on file   Occupational History     Not on file   Tobacco Use     Smoking status: Former     Current packs/day: 0.00     Average packs/day: 1 pack/day for 5.6 years (5.6 ttl pk-yrs)     Types: Cigarettes     Start date: 1/1/1976     Quit date: 8/1/1981      Years since quittin.2     Smokeless tobacco: Never   Substance and Sexual Activity     Alcohol use: Not Currently     Comment: always limited to no more than 1 glass/qrtr.     Drug use: Never     Sexual activity: Not Currently     Partners: Male     Birth control/protection: Female Surgical     Comment: husbands has prostate cancer, last 4 years   Other Topics Concern     Not on file   Social History Narrative     Not on file     Social Determinants of Health     Financial Resource Strain: Low Risk  (2024)    Received from [employer] [facility], [facility] [employer] [facility]    Financial Resource Strain      Difficulty of Paying Living Expenses: 3      Difficulty of Paying Living Expenses: Not on file   Food Insecurity: No Food Insecurity (2024)    [facility] from [employer] [facility]    Food Insecurity      Do you worry your food will run out before you are able to buy more?: 1   Transportation Needs: No Transportation Needs (2024)    Received from [employer] [facility], [facility] [employer]  [facility]    Transportation Needs      Lack of Transportation (Medical): 1   Physical Activity: Sufficiently Active (2022)    Received from [facility]    Exercise Vital Sign      Days of Exercise per Week: 2 days      Minutes of Exercise per Session: 130 min   Stress: No Stress Concern Present (2022)    Received from [facility]    [place] [employer] - Occupational Stress Questionnaire      Feeling of Stress : Only a little   Social Connections: Socially Integrated (2024)    Received from [facility] [employer] [facility], [facility] [employer] [facility]    Social Connections      Frequency of Communication with Friends and Family: 0   Interpersonal Safety: Not At Risk (2022)    Received from [facility]     "Humiliation, Afraid, Rape, and Kick questionnaire      Fear of Current or Ex-Partner: No      Emotionally Abused: No      Physically Abused: No      Sexually Abused: No   Housing Stability: Low Risk  (1/4/2024)    Received from [employer] [facility]    Housing Stability      What is your housing situation today?: 1     Worked in IT for [employer].    Tried working at [employer], but had to stop due to breathing.           Medications:     Current Outpatient Medications   Medication Sig Dispense Refill     albuterol (PROAIR HFA/PROVENTIL HFA/VENTOLIN HFA) 108 (90 Base) MCG/ACT inhaler Inhale 1-2 puffs into the lungs every 4 hours as needed.       levothyroxine (SYNTHROID/LEVOTHROID) 50 MCG tablet Take 50 mcg by mouth       triamcinolone (KENALOG) 0.1 % external ointment Apply thin layer to affected areas on body 1-2 times a day as needed. Do not use on face/neck/ears/underarms/groin.       No current facility-administered medications for this visit.            Review of Systems:       A complete review of systems was otherwise negative except as noted in the HPI.      PHYSICAL EXAM:  /70   Pulse 65   Ht 1.6 m (5' 2.99\")   Wt 95.3 kg (210 lb)   SpO2 98%   BMI 37.21 kg/m       General: Comfortable, No apparent distress  Eyes: Anicteric  Nose: Nasal mucosa with no edema or hyperemia.  No polyps  Ears: Hearing grossly normal  Mouth: Oral mucosa is moist, without any lesions. No oropharyngeal exudate.  Neck: supple, no thyromegaly  Lymphatics: No cervical or supraclavicular nodes  Respiratory: Good air movement. No crackles. No rhonchi. No wheezes  Cardiac: RRR, normal S1, S2. No murmurs. No JVD  Musculoskeletal: Extremities normal. No clubbing. No cyanosis. No edema.  Skin: No rash on limited exam  Neuro: Normal mentation. Normal speech.  Psych:Normal affect           Data:   All laboratory and imaging data reviewed.       Latest Reference Range & Units 02/19/24 08:22   Sodium 135 - 145 " mmol/L 142   Potassium 3.4 - 5.3 mmol/L 3.9   Chloride 98 - 107 mmol/L 105   Carbon Dioxide (CO2) 22 - 29 mmol/L 27   Urea Nitrogen 8.0 - 23.0 mg/dL 13.2   Creatinine 0.51 - 0.95 mg/dL 0.97 (H)   GFR Estimate >60 mL/min/1.73m2 66   Calcium 8.8 - 10.2 mg/dL 9.6   Anion Gap 7 - 15 mmol/L 10   Albumin 3.5 - 5.2 g/dL 4.5   Protein Total 6.4 - 8.3 g/dL 6.9   Alkaline Phosphatase 40 - 150 U/L 97   ALT 0 - 50 U/L 17   AST 0 - 45 U/L 19   Bilirubin Total <=1.2 mg/dL 0.4   Ferritin 11 - 328 ng/mL 118   Glucose 70 - 99 mg/dL 98   Iron 37 - 145 ug/dL 76   Iron Binding Capacity 240 - 430 ug/dL 230 (L)   Iron Sat Index 15 - 46 % 33   WBC 4.0 - 11.0 10e3/uL 6.5   Hemoglobin 11.7 - 15.7 g/dL 14.7   Hematocrit 35.0 - 47.0 % 45.2   Platelet Count 150 - 450 10e3/uL 37 (LL)   RBC Count 3.80 - 5.20 10e6/uL 5.01   MCV 78 - 100 fL 90   MCH 26.5 - 33.0 pg 29.3   MCHC 31.5 - 36.5 g/dL 32.5   RDW 10.0 - 15.0 % 12.5   (LL): Data is critically low  (H): Data is abnormally high  (L): Data is abnormally low    PFT:   Pulmonary function testing in 2015 shows an FEV1 of 2.52 L which is 95% predicted.  It increased to 2.62 L after the administration of albuterol which is 99% predicted.  Her FVC was 3.18 L which is 95% predicted and remained at 3.18 L after albuterol.  FEV1/FVC ratio 0.79.  Her diffusion capacity is 21.2 which is 91% predicted.          PFT Interpretation:  No airflow obstruction  Normal lung volumes  Normal diffusion capacity  Valid Maneuver    CXR: I have reviewed the CXR images from October 22, 2024 and agree with the radiologist interpretation below:  Frontal and lateral radiographic views of the chest were obtained.  Cholecystectomy clips.. Trachea is midline. Very mild perihilar  prominence Cardiomediastinal silhouette is otherwise within normal  limits. No focal airspace opacity. No pneumothorax or pleural  effusion. Normal lung volumes. The visualized upper abdomen is  unremarkable.                                                                        Impression:   Nonspecific perihilar prominence, which can be seen with reactive  airways versus viral infection.    Chest CT: I have reviewed the chest CT images from April 2022 and agree with the radiologist interpretation below:  LUNGS AND PLEURA: Normal. Lungs clear. No pulmonary nodule or mass. No pleural effusion.     MEDIASTINUM/AXILLAE: No adenopathy. No pericardial effusion. Small sliding esophageal hiatal hernia.     CORONARY ARTERY CALCIFICATION: None.     HEPATOBILIARY: Cholecystectomy. Normal liver. No focal liver lesion.     PANCREAS: Normal.     SPLEEN: Normal.     ADRENAL GLANDS: Normal.     KIDNEYS/BLADDER: Normal.     BOWEL: Normal.     LYMPH NODES: No adenopathy in the abdomen or pelvis.     VASCULATURE: Minimal atherosclerotic calcifications. Normal caliber abdominal aorta.     PELVIC ORGANS: Hysterectomy. Few pelvic phleboliths.     MUSCULOSKELETAL: No suspicious osseous lesion. Mild scattered degenerative changes in the spine.    Abdominal ultrasound 2022:  GALLBLADDER: Absent.     BILE DUCTS: No biliary dilatation. The common duct measures 6 mm.     LIVER: Normal parenchyma with smooth contour. No focal mass.     RIGHT KIDNEY: Normal size. Normal echogenicity with no hydronephrosis or mass.     LEFT KIDNEY: Normal size. Normal echogenicity with no hydronephrosis or mass.     SPLEEN: Normal, measuring 11.2 x 3.9 x 4.5 cm.     PANCREAS: Normal where seen, though the tip of the tail is obscured by bowel gas.     AORTA: Normal in caliber.     IVC: Normal where visualized.     No ascites.     IMPRESSION:   Normal abdomen postcholecystectomy. No splenomegaly. No findings to account for right upper quadrant pain.             Recent Results (from the past 168 hour(s))   Pulmonary function test    Collection Time: 10/21/24 12:36 PM   Result Value Ref Range    FVC-Pred 2.73 L    FVC-Pre 2.85 L    FVC-%Pred-Pre 104 %    FEV1-Pre 2.40 L    FEV1-%Pred-Pre 110 %    FEV1FVC-Pred 80  %    FEV1FVC-Pre 84 %    FEFMax-Pred 5.97 L/sec    FEFMax-Pre 6.84 L/sec    FEFMax-%Pred-Pre 114 %    FEF2575-Pred 1.99 L/sec    FEF2575-Pre 2.52 L/sec    WDI8007-%Pred-Pre 126 %    ExpTime-Pre 6.50 sec    FIFMax-Pre 5.76 L/sec    VC-Pred 3.09 L    VC-Pre 2.90 L    VC-%Pred-Pre 94 %    IC-Pred 2.04 L    IC-Pre 2.62 L    IC-%Pred-Pre 128 %    ERV-Pred 1.02 L    ERV-Pre 0.28 L    ERV-%Pred-Pre 27 %    FEV1FEV6-Pred 80 %    FEV1FEV6-Pre 84 %    FRCPleth-Pred 2.64 L    FRCPleth-Pre 2.35 L    FRCPleth-%Pred-Pre 88 %    RVPleth-Pred 1.90 L    RVPleth-Pre 2.07 L    RVPleth-%Pred-Pre 109 %    TLCPleth-Pred 4.75 L    TLCPleth-Pre 4.97 L    TLCPleth-%Pred-Pre 104 %    DLCOunc-Pred 19.00 ml/min/mmHg    DLCOunc-Pre 20.11 ml/min/mmHg    DLCOunc-%Pred-Pre 105 %    VA-Pre 4.01 L    VA-%Pred-Pre 89 %    FEV1SVC-Pred 70 %    FEV1SVC-Pre 83 %                                           Again, thank you for allowing me to participate in the care of your patient.        Sincerely,        Av Bauman MD

## 2024-10-21 NOTE — PROGRESS NOTES
[facility] [address] [address]  Phone: [phone]  Fax: [phone]    Patient:  Maxim Potter, Date of birth 1960  Date of Visit:  10/21/2024  Referring Provider Referred Self      Assessment & Plan      Reported heterozygote for alpha-1 antitrypsin mutation  History of exercise-induced asthma  Chronic cough    Regarding the alpha-1 antitrypsin diagnosis, I cannot find any blood work in our system that confirms this diagnosis.  We will perform an alpha ID fingerstick test today in order to look for both a protein level and any mutations she may have.  We spent a long time today in the clinic discussing what alpha-1 antitrypsin is, how it can affect the liver and the lung.  She does have a very remote smoking history, but recent testing shows statistically normal lung function and a chest CT scan does not mention any significant emphysema.  This would fit with an alpha-1 antitrypsin heterozygote phenotype.  I tried to provide her reassurance that as long as she does not start smoking, I do not suspect she will ever have any clinically significant lung disease related to alpha-1 antitrypsin.  I also let her know that it be very unlikely for her to develop any significant liver disease.  Recent liver enzymes are normal, and a relatively recent liver ultrasound also appears normal.  Pending the results of her alpha-1 fingerstick test, I advised that she return to the clinic in 3 years or so with repeat pulmonary function testing.    We did talk about her chronic cough today.  Her chest x-ray does show some peribronchial cuffing in my opinion.  She does not have any wheezing or crackling on exam, but given her cough symptoms history of possible asthma, and peribronchial cuffing it would be reasonable to try a low-dose inhaled corticosteroid to see if her nocturnal cough goes away.  She is not interested in doing this  right now.  The cough is not that bothersome to her, and she would prefer to monitor her symptoms.    Medical Decision Making      I spent a total of 70 minutes on the day of the visit.   Time spent by me doing chart review, history and exam, documentation and further activities per the note       Av Bauman MD                Today's visit note:     Chief Complaint: Consult (New Asthma )      HISTORY OF PRESENT ILLNESS:    Maxim Potter is a 64 year old year old female who is being seen for cough and history of an alpha 1 mutation.     She has a long history of exercise-induced asthma.  She was first prescribed an inhaler about 30 years ago, but her symptoms preceded that.  She started using the inhaler because she was having even more bronchitis issues at that time.  She was given some oral steroids at that time as well, but had a bad reaction and is never taken oral steroids again.  She used to do triathlons and a lot of ballroom dancing.  Albuterol significantly helped her respiratory status when doing those activities.    Over the last 1-1/2 years, she has been coughing a lot at bedtime.  It is not really a problem during the day at all.  The coughing at night is something that she mentions, but is not very concerned about.  It is nonproductive.  It is not disrupting her sleep all that well.    She does not have any postnasal drip symptoms, but she does have some gastroesophageal reflux symptoms after eating.  She does have a history of colitis as well.    She mentions that she has an alpha-1 antitrypsin mutation, and that she is a heterozygote.  She actually travel to [facility] back in 2015 to be evaluated.  The details of that evaluation are not clear to me at this time.  She mentions a few people in her family tree that also have a mutation, but she is aware of only 2 that have a homozygous ZZ mutation.    She also has some disorder with her platelets, but is currently being  worked up by Dr. Dale in the bleeding and clotting clinic.    She is concerned that her alpha 1 mutation and her platelet disorder could somehow be intertwined and indicative of significant health consequences in the future for her.           Past Medical and Surgical History:     Past Medical History:   Diagnosis Date    Thyroid disease 2012    hashimoto biopsy , started thyroid med sring      Past Surgical History:   Procedure Laterality Date    ABDOMEN SURGERY  1986    C section    BIOPSY  ,     bone marrow biopsy, breast biopsy    CHOLECYSTECTOMY  2004    COLONOSCOPY  [id number]    yearly, else routine    HEAD & NECK SURGERY  1985    TMJ           Family History:     Family History   Problem Relation Age of Onset    Diabetes Mother         alpha one    Coronary Artery Disease Mother     Hypertension Mother     Genetic Disorder Mother         alpha one    Thyroid Disease Mother         hypothyroid    Obesity Mother     LUNG DISEASE Mother         scerosis of the liver    Asthma Maternal Grandfather         alpha one    Cerebrovascular Disease Maternal Grandmother     Breast Cancer Sister         liver, lung, breast cancer; AAD 52              Social History:     Social History     Socioeconomic History    Marital status:      Spouse name: Not on file    Number of children: Not on file    Years of education: Not on file    Highest education level: Not on file   Occupational History    Not on file   Tobacco Use    Smoking status: Former     Current packs/day: 0.00     Average packs/day: 1 pack/day for 5.6 years (5.6 ttl pk-yrs)     Types: Cigarettes     Start date: 1976     Quit date: 1981     Years since quittin.2    Smokeless tobacco: Never   Substance and Sexual Activity    Alcohol use: Not Currently     Comment: always limited to no more than 1 glass/qrtr.    Drug use: Never    Sexual activity: Not Currently     Partners: Male     Birth control/protection: Female  Surgical     Comment: husbands has prostate cancer, last 4 years   Other Topics Concern    Not on file   Social History Narrative    Not on file     Social Determinants of Health     Financial Resource Strain: Low Risk  (1/4/2024)    [facility]    Financial Resource Strain     Difficulty of Paying Living Expenses: 3     Difficulty of Paying Living Expenses: Not on file   Food Insecurity: No Food Insecurity (1/4/2024)    [facility]    Food Insecurity     Do you worry your food will run out before you are able to buy more?: 1   Transportation Needs: No Transportation Needs (1/4/2024)    [facility], [facility] [employer] [facility]    Transportation Needs     Lack of Transportation (Medical): 1   Physical Activity: Sufficiently Active (8/2/2022)    Received from [facility]    Exercise Vital Sign     Days of Exercise per Week: 2 days     Minutes of Exercise per Session: 130 min   Stress: No Stress Concern Present (8/2/2022)    Received from [facility], [facility]    [employer] [place] [employer] - Occupational Stress Questionnaire     Feeling of Stress : Only a little   Social Connections: Socially Integrated (1/4/2024)    [facility], [facility] [employer] [facility]    Social Connections     Frequency of Communication with Friends and Family: 0   Interpersonal Safety: Not At Risk (8/2/2022)    Received from [facility]    Humiliation, Afraid, Rape, and Kick questionnaire     Fear of Current or Ex-Partner: No     Emotionally Abused: No     Physically Abused: No     Sexually Abused: No   Housing Stability: Low Risk  (1/4/2024)    [facility]    Housing Stability     What is your  "housing situation today?: 1     Worked in IT for [employer].    Tried working at [employer], but had to stop due to breathing.           Medications:     Current Outpatient Medications   Medication Sig Dispense Refill    albuterol (PROAIR HFA/PROVENTIL HFA/VENTOLIN HFA) 108 (90 Base) MCG/ACT inhaler Inhale 1-2 puffs into the lungs every 4 hours as needed.      levothyroxine (SYNTHROID/LEVOTHROID) 50 MCG tablet Take 50 mcg by mouth      triamcinolone (KENALOG) 0.1 % external ointment Apply thin layer to affected areas on body 1-2 times a day as needed. Do not use on face/neck/ears/underarms/groin.       No current facility-administered medications for this visit.            Review of Systems:       A complete review of systems was otherwise negative except as noted in the HPI.      PHYSICAL EXAM:  /70   Pulse 65   Ht 1.6 m (5' 2.99\")   Wt 95.3 kg (210 lb)   SpO2 98%   BMI 37.21 kg/m       General: Comfortable, No apparent distress  Eyes: Anicteric  Nose: Nasal mucosa with no edema or hyperemia.  No polyps  Ears: Hearing grossly normal  Mouth: Oral mucosa is moist, without any lesions. No oropharyngeal exudate.  Neck: supple, no thyromegaly  Lymphatics: No cervical or supraclavicular nodes  Respiratory: Good air movement. No crackles. No rhonchi. No wheezes  Cardiac: RRR, normal S1, S2. No murmurs. No JVD  Musculoskeletal: Extremities normal. No clubbing. No cyanosis. No edema.  Skin: No rash on limited exam  Neuro: Normal mentation. Normal speech.  Psych:Normal affect           Data:   All laboratory and imaging data reviewed.       Latest Reference Range & Units 02/19/24 08:22   Sodium 135 - 145 mmol/L 142   Potassium 3.4 - 5.3 mmol/L 3.9   Chloride 98 - 107 mmol/L 105   Carbon Dioxide (CO2) 22 - 29 mmol/L 27   Urea Nitrogen 8.0 - 23.0 mg/dL 13.2   Creatinine 0.51 - 0.95 mg/dL 0.97 (H)   GFR Estimate >60 mL/min/1.73m2 66   Calcium 8.8 - 10.2 mg/dL 9.6   Anion Gap 7 - 15 mmol/L 10   Albumin 3.5 - 5.2 g/dL 4.5 "   Protein Total 6.4 - 8.3 g/dL 6.9   Alkaline Phosphatase 40 - 150 U/L 97   ALT 0 - 50 U/L 17   AST 0 - 45 U/L 19   Bilirubin Total <=1.2 mg/dL 0.4   Ferritin 11 - 328 ng/mL 118   Glucose 70 - 99 mg/dL 98   Iron 37 - 145 ug/dL 76   Iron Binding Capacity 240 - 430 ug/dL 230 (L)   Iron Sat Index 15 - 46 % 33   WBC 4.0 - 11.0 10e3/uL 6.5   Hemoglobin 11.7 - 15.7 g/dL 14.7   Hematocrit 35.0 - 47.0 % 45.2   Platelet Count 150 - 450 10e3/uL 37 (LL)   RBC Count 3.80 - 5.20 10e6/uL 5.01   MCV 78 - 100 fL 90   MCH 26.5 - 33.0 pg 29.3   MCHC 31.5 - 36.5 g/dL 32.5   RDW 10.0 - 15.0 % 12.5   (LL): Data is critically low  (H): Data is abnormally high  (L): Data is abnormally low    PFT:   Pulmonary function testing in 2015 shows an FEV1 of 2.52 L which is 95% predicted.  It increased to 2.62 L after the administration of albuterol which is 99% predicted.  Her FVC was 3.18 L which is 95% predicted and remained at 3.18 L after albuterol.  FEV1/FVC ratio 0.79.  Her diffusion capacity is 21.2 which is 91% predicted.          PFT Interpretation:  No airflow obstruction  Normal lung volumes  Normal diffusion capacity  Valid Maneuver    CXR: I have reviewed the CXR images from October 22, 2024 and agree with the radiologist interpretation below:  Frontal and lateral radiographic views of the chest were obtained.  Cholecystectomy clips.. Trachea is midline. Very mild perihilar  prominence Cardiomediastinal silhouette is otherwise within normal  limits. No focal airspace opacity. No pneumothorax or pleural  effusion. Normal lung volumes. The visualized upper abdomen is  unremarkable.                                                                       Impression:   Nonspecific perihilar prominence, which can be seen with reactive  airways versus viral infection.    Chest CT: I have reviewed the chest CT images from April 2022 and agree with the radiologist interpretation below:  LUNGS AND PLEURA: Normal. Lungs clear. No pulmonary nodule  or mass. No pleural effusion.     MEDIASTINUM/AXILLAE: No adenopathy. No pericardial effusion. Small sliding esophageal hiatal hernia.     CORONARY ARTERY CALCIFICATION: None.     HEPATOBILIARY: Cholecystectomy. Normal liver. No focal liver lesion.     PANCREAS: Normal.     SPLEEN: Normal.     ADRENAL GLANDS: Normal.     KIDNEYS/BLADDER: Normal.     BOWEL: Normal.     LYMPH NODES: No adenopathy in the abdomen or pelvis.     VASCULATURE: Minimal atherosclerotic calcifications. Normal caliber abdominal aorta.     PELVIC ORGANS: Hysterectomy. Few pelvic phleboliths.     MUSCULOSKELETAL: No suspicious osseous lesion. Mild scattered degenerative changes in the spine.    Abdominal ultrasound 2022:  GALLBLADDER: Absent.     BILE DUCTS: No biliary dilatation. The common duct measures 6 mm.     LIVER: Normal parenchyma with smooth contour. No focal mass.     RIGHT KIDNEY: Normal size. Normal echogenicity with no hydronephrosis or mass.     LEFT KIDNEY: Normal size. Normal echogenicity with no hydronephrosis or mass.     SPLEEN: Normal, measuring 11.2 x 3.9 x 4.5 cm.     PANCREAS: Normal where seen, though the tip of the tail is obscured by bowel gas.     AORTA: Normal in caliber.     IVC: Normal where visualized.     No ascites.     IMPRESSION:   Normal abdomen postcholecystectomy. No splenomegaly. No findings to account for right upper quadrant pain.             Recent Results (from the past 168 hour(s))   Pulmonary function test    Collection Time: 10/21/24 12:36 PM   Result Value Ref Range    FVC-Pred 2.73 L    FVC-Pre 2.85 L    FVC-%Pred-Pre 104 %    FEV1-Pre 2.40 L    FEV1-%Pred-Pre 110 %    FEV1FVC-Pred 80 %    FEV1FVC-Pre 84 %    FEFMax-Pred 5.97 L/sec    FEFMax-Pre 6.84 L/sec    FEFMax-%Pred-Pre 114 %    FEF2575-Pred 1.99 L/sec    FEF2575-Pre 2.52 L/sec    MQT2853-%Pred-Pre 126 %    ExpTime-Pre 6.50 sec    FIFMax-Pre 5.76 L/sec    VC-Pred 3.09 L    VC-Pre 2.90 L    VC-%Pred-Pre 94 %    IC-Pred 2.04 L    IC-Pre 2.62 L     IC-%Pred-Pre 128 %    ERV-Pred 1.02 L    ERV-Pre 0.28 L    ERV-%Pred-Pre 27 %    FEV1FEV6-Pred 80 %    FEV1FEV6-Pre 84 %    FRCPleth-Pred 2.64 L    FRCPleth-Pre 2.35 L    FRCPleth-%Pred-Pre 88 %    RVPleth-Pred 1.90 L    RVPleth-Pre 2.07 L    RVPleth-%Pred-Pre 109 %    TLCPleth-Pred 4.75 L    TLCPleth-Pre 4.97 L    TLCPleth-%Pred-Pre 104 %    DLCOunc-Pred 19.00 ml/min/mmHg    DLCOunc-Pre 20.11 ml/min/mmHg    DLCOunc-%Pred-Pre 105 %    VA-Pre 4.01 L    VA-%Pred-Pre 89 %    FEV1SVC-Pred 70 %    FEV1SVC-Pre 83 %

## 2024-10-22 LAB
DLCOUNC-%PRED-PRE: 105 %
DLCOUNC-PRE: 20.11 ML/MIN/MMHG
DLCOUNC-PRED: 19 ML/MIN/MMHG
ERV-%PRED-PRE: 27 %
ERV-PRE: 0.28 L
ERV-PRED: 1.02 L
EXPTIME-PRE: 6.5 SEC
FEF2575-%PRED-PRE: 126 %
FEF2575-PRE: 2.52 L/SEC
FEF2575-PRED: 1.99 L/SEC
FEFMAX-%PRED-PRE: 114 %
FEFMAX-PRE: 6.84 L/SEC
FEFMAX-PRED: 5.97 L/SEC
FEV1-%PRED-PRE: 110 %
FEV1-PRE: 2.4 L
FEV1FEV6-PRE: 84 %
FEV1FEV6-PRED: 80 %
FEV1FVC-PRE: 84 %
FEV1FVC-PRED: 80 %
FEV1SVC-PRE: 83 %
FEV1SVC-PRED: 70 %
FIFMAX-PRE: 5.76 L/SEC
FRCPLETH-%PRED-PRE: 88 %
FRCPLETH-PRE: 2.35 L
FRCPLETH-PRED: 2.64 L
FVC-%PRED-PRE: 104 %
FVC-PRE: 2.85 L
FVC-PRED: 2.73 L
IC-%PRED-PRE: 128 %
IC-PRE: 2.62 L
IC-PRED: 2.04 L
RVPLETH-%PRED-PRE: 109 %
RVPLETH-PRE: 2.07 L
RVPLETH-PRED: 1.9 L
TLCPLETH-%PRED-PRE: 104 %
TLCPLETH-PRE: 4.97 L
TLCPLETH-PRED: 4.75 L
VA-%PRED-PRE: 89 %
VA-PRE: 4.01 L
VC-%PRED-PRE: 94 %
VC-PRE: 2.9 L
VC-PRED: 3.09 L

## 2025-02-16 ENCOUNTER — HEALTH MAINTENANCE LETTER (OUTPATIENT)
Age: 65
End: 2025-02-16

## 2025-04-01 ENCOUNTER — TELEPHONE (OUTPATIENT)
Dept: HEMATOLOGY | Facility: CLINIC | Age: 65
End: 2025-04-01
Payer: COMMERCIAL

## 2025-04-01 NOTE — TELEPHONE ENCOUNTER
[id number]  Maxim Potter  65 year old female  [facility] Diagnosis: ITP  CB[facility] Provider: Chito    Patient called and asked when she was supposed to get her platelets checked. She last had them checked in December and note says every 3-6 months. She is traveling to [place] in June for a few weeks and wondering if okay to check after her return. I told her I would recommend getting her labs checked prior to her trip to ensure they are safe for travel.  She plans to schedule her own lab appointment closer to the date.  Lindsey Villavicencio, MSN, RN, PHN -Nurse Clinician, [facility] [phone]

## 2025-05-27 ENCOUNTER — TELEPHONE (OUTPATIENT)
Dept: HEMATOLOGY | Facility: CLINIC | Age: 65
End: 2025-05-27
Payer: COMMERCIAL

## 2025-05-27 NOTE — TELEPHONE ENCOUNTER
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 She has been sick the past couple days. Today, she has headache/fever/nausea. She is wondering if she should get platelet count done today.    RN spoke with Dr. Dale who recommends she see primary care/urgent care for an evaluation. While there, they can get a CBC.    RN let Maxim know and she agrees with plan. RN will keep an eye out for platelet count.      Sherly Gates RN, BSN, PCCN  Nurse Clinician    [facility]  [address] [place] [address], [address], [place] [address]   Office, direct: [phone]  Main office number: [phone]  Pronouns: She, her, hers

## 2025-07-10 ENCOUNTER — OFFICE VISIT (OUTPATIENT)
Dept: HEMATOLOGY | Facility: CLINIC | Age: 65
End: 2025-07-10
Attending: INTERNAL MEDICINE
Payer: COMMERCIAL

## 2025-07-10 VITALS
WEIGHT: 212.3 LBS | DIASTOLIC BLOOD PRESSURE: 85 MMHG | SYSTOLIC BLOOD PRESSURE: 141 MMHG | OXYGEN SATURATION: 100 % | TEMPERATURE: 97 F | BODY MASS INDEX: 37.62 KG/M2 | HEART RATE: 62 BPM

## 2025-07-10 RX ORDER — TIZANIDINE 2 MG/1
2 TABLET ORAL
COMMUNITY
Start: 2025-04-24

## 2025-07-10 NOTE — PROGRESS NOTES
[facility]  [address], [address], [place] [address]  Main: [phone], Fax: [phone]        Outpatient Clinic Visit  Date:  Jul 10, 2025      Maxim Potter is a 65-year-old woman here today for follow-up and further discussion regarding the etiology of her chronic thrombocytopenia.  Details of her history can be found in our previous note from 2/8/2024, and the subsequent addenda.    In summary, she has chronic thrombocytopenia which was first noted in approximately 2015 and seems to have worsened over time.  However, over the last 3 years, her platelet count has been fairly stable in the 40-50K range.  She has a personal and family history of bleeding issues including easy bruising, epistaxis, and heavy menstrual bleeding.  It was initially felt that her thrombocytopenia was due to chronic ITP although the amount of bleeding she has experienced is out of proportion to what would be expected for her platelet counts, and the apparent familial history would also be inconsistent with that diagnosis.  A primary bone marrow disorder such as MDS has also been considered, although again, the family history would suggest otherwise.  In addition, bone marrow biopsy performed in 2022 showed no morphologic evidence of dysplasia and cytogenetic studies were also normal.    Thus, we have suspected the presence of of a congenital platelet disorder, and pursued additional testing to try to clarify that.  Platelet aggregation testing was performed and was not diagnostic of a platelet function disorder given the limitations of the testing based on the fact that her platelet count was not normal when the testing was performed.  However, platelet electron microscopy showed clear evidence of ultrastructural abnormalities including decreased alpha granules and increased canalicular networks which are suggestive of an underlying platelet disorder.  We  subsequently performed genetic testing but failed to identify any pathogenic or likely pathogenic variants in the gene panel tested.    She has 2 children; her daughter has borderline low platelets (specific details unclear), but she reports that her son has a platelet count in the 100,000-115,000 range.  He is currently 39 years old, and this reported platelet count is similar to Maxim's platelet count several years ago.  In addition, she has 4 grandchildren (her daughter has 2 teenage girls, and her son has an 11-year-old daughter and a 9-year-old son).  It is unclear whether any of these children have had their platelet counts checked, although she reports that some of them seem to have a bleeding tendency similar to her own, and what has been seen in other members of her extended family (easy bruising, epistaxis, menorrhagia).    Since her last visit she has not noticed any particular bleeding events - denied hematuria, hemoptysis, hematemesis, blood in stools, mucocutaneous bleeding. She is not taking any herbal supplements.       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  Maxim has a personal and family history of bleeding tendency consistent with a defect in primary hemostasis.  Von Willebrand disease and other coagulation factor deficiencies have been ruled out.  She has chronic thrombocytopenia which was first noted a number of years ago and seems to have worsened over time.  As outlined above, our diagnostic evaluation has identified ultrastructural abnormalities in her platelets which would support a diagnosis of a congenital platelet disorder.  Although we did not find any pathogenic or likely pathogenic mutations in the gene panel we tested, there are additional genes which have not yet been assessed.    Her son has thrombocytopenia with a platelet count in the 100,000-115,000 range which is similar to where her platelet count was several years ago.  This suggests that perhaps  there is a familial platelet disorder here and that one feature of it is progressive thrombocytopenia over time. Thus, we evaluated her son, including platelet electron microscopy and he was noted to have clear evidence of ultrastructural abnormalities such as  increased canalicular networks but his alpha granules were noted to be WNL.     Recommendations:  - Continue to follow serial CBC with platelets to monitor platelet levels. Testing can be done every 6-12 months based on patient preference. Can be done by her primary care.   - No role for any therapy to increase platelet count if noted to be greater than 30 K  - We will discuss a plan for thrombocytopenia if the patient is due for any elective procedures.   - In case of emergent surgical intervention, she should get platelet transfusions to ensure platelets above desired surgical threshold.   - Will follow up the patient as needed and will be available if any questions come up.   - Discussed with patient who demonstrated understanding of the current plan.     Patient was seen and evaluated with Dr. Dale. Attestation to follow.       Cornel Haile MD  Hematology/Oncology/BMT Fellow PGY 5  Pager: [phone]

## 2025-07-30 ENCOUNTER — MYC MEDICAL ADVICE (OUTPATIENT)
Dept: HEMATOLOGY | Facility: CLINIC | Age: 65
End: 2025-07-30
Payer: COMMERCIAL